# Patient Record
Sex: FEMALE | Employment: OTHER | URBAN - METROPOLITAN AREA
[De-identification: names, ages, dates, MRNs, and addresses within clinical notes are randomized per-mention and may not be internally consistent; named-entity substitution may affect disease eponyms.]

---

## 2017-03-29 ENCOUNTER — TRANSFERRED RECORDS (OUTPATIENT)
Dept: MULTI SPECIALTY CLINIC | Facility: CLINIC | Age: 52
End: 2017-03-29

## 2017-03-29 LAB
HPV ABSTRACT: NORMAL
PAP-ABSTRACT: NORMAL

## 2017-06-07 ENCOUNTER — PRE VISIT (OUTPATIENT)
Dept: NEUROLOGY | Facility: CLINIC | Age: 52
End: 2017-06-07

## 2017-06-07 NOTE — TELEPHONE ENCOUNTER
1.  Date/reason for appt:6/12/17, Myontonia Congenita  2.  Referring provider: ANUPAMA KIM  3.  Call to patient (Yes / No - short description): Yes, pt state last time she was seen was in 2010.   4.  Previous care at / records requested from:  5.  Other:

## 2017-06-12 ENCOUNTER — OFFICE VISIT (OUTPATIENT)
Dept: NEUROLOGY | Facility: CLINIC | Age: 52
End: 2017-06-12

## 2017-06-12 VITALS
HEIGHT: 67 IN | WEIGHT: 148 LBS | HEART RATE: 85 BPM | SYSTOLIC BLOOD PRESSURE: 134 MMHG | DIASTOLIC BLOOD PRESSURE: 71 MMHG | BODY MASS INDEX: 23.23 KG/M2

## 2017-06-12 DIAGNOSIS — G89.4 CHRONIC PAIN SYNDROME: ICD-10-CM

## 2017-06-12 DIAGNOSIS — G71.12 MYOTONIA CONGENITA: Primary | ICD-10-CM

## 2017-06-12 RX ORDER — GABAPENTIN 300 MG/1
CAPSULE ORAL
Qty: 90 CAPSULE | Refills: 1 | Status: SHIPPED | OUTPATIENT
Start: 2017-06-12 | End: 2017-06-22

## 2017-06-12 RX ORDER — ESCITALOPRAM OXALATE 5 MG/5ML
10 SOLUTION ORAL DAILY
COMMUNITY
End: 2018-06-27

## 2017-06-12 ASSESSMENT — PAIN SCALES - GENERAL: PAINLEVEL: NO PAIN (0)

## 2017-06-12 NOTE — MR AVS SNAPSHOT
After Visit Summary   6/12/2017    Odalys Bah    MRN: [id number]           Patient Information     Date Of Birth          1965        Visit Information        Provider Department      6/12/2017 2:50 PM Valeriy Perez MD [facility] Neurology        Today's Diagnoses     Myotonia congenita    -  1    Chronic pain syndrome           Follow-ups after your visit        Additional Services     [facility]       Your provider has referred you to: [facility] for Comprehensive Pain Management. Please call [phone] to make an appointment.     Clinic is located: [facility] [address] [place] [address] [id number] 4th [place] [address]      Please complete the following questions:    Procedure/Referral: Referral Only -  Comprehensive Evaluation and Management    What is your diagnosis for the patient's pain? Myotonia congenita but also myofascial pain    What are your specific questions for the pain specialist? Multidisciplinary management and education on chronic pain.     Are there any red flags that may impact the assessment or management of the patient? None    REGARDING OPIOID MEDICATIONS:  We will always address appropriateness of opioid pain medications. We do not prescribe on the patient's first visit and generally will not take on a prescribing role for stable chronic pain. When we do take on prescribing of opioids for chronic pain, it is in collaboration with the referring physician for an determined period of time (usually weeks to months), with an expectation that the primary physician or provider will assume the prescribing role if medications are effective at stable doses with demonstrated compliance.  Therefore, please do not assume that your prescribing responsibilities end on the day of pain clinic consultation.  Is this agreeable to you? YES      Please be aware that coverage of these services is subject to  the terms and limitations of your health insurance plan.  Call member services at your health plan with any benefit or coverage questions.      Please bring the following with you to your appointment or have sent to the [facility]:    (1) Any X-Rays, CTs or MRIs which have been performed that are not in Epic.  Contact the facility where they were done to arrange for  prior to your scheduled appointment.  Any new CT, MRI or other procedures ordered by your specialist must be performed at a [facility] facility or coordinated by your clinic's referral office.    (2) List of current medications   (3) This referral request   (4) Any documents/labs given to you for this referral                  Your next 10 appointments already scheduled     Jun 11, 2018 10:50 AM CDT   (Arrive by 10:35 AM)   Return Muscular Dystrophy with Valeriy Perez MD   [facility] ([facility])    [address] [place] [id number] [phone]              Future tests that were ordered for you today     Open Future Orders        Priority Expected Expires Ordered    PFT General Lab Testing Routine 6/12/2017 6/7/2018 6/7/2017            Who to contact     Please call your clinic at [phone] to:    Ask questions about your health    Make or cancel appointments    Discuss your medicines    Learn about your test results    Speak to your doctor   If you have compliments or concerns about an experience at your clinic, or if you wish to file a complaint, please contact [facility] Patient Relations at [phone] or email us at [email or web address]         Additional Information About Your Visit        SourceryharStipple Information     Nerdies is an electronic gateway that provides easy, online access to your medical records. With Nerdies, you can request a clinic appointment, read your test results, renew a prescription or communicate with your  "care team.     To sign up for [employer] Trust[employer] visit the website at [email or web address]   You will be asked to enter the access code listed below, as well as some personal information. Please follow the directions to create your username and password.     Your access code is: [email or web address]  Expires: 2017  6:30 AM     Your access code will  in 90 days. If you need help or a new code, please contact your [facility] or call [phone] for assistance.        Care EveryWhere ID     This is your Care EveryWhere ID. This could be used by other organizations to access your [facility] medical records  [id number]        Your Vitals Were     Pulse Height BMI (Body Mass Index)             85 1.689 m (5' 6.5\") 23.53 kg/m2          Blood Pressure from Last 3 Encounters:   17 134/71    Weight from Last 3 Encounters:   17 67.1 kg (148 lb)              We Performed the Following     [facility]          Today's Medication Changes          These changes are accurate as of: 17  3:52 PM.  If you have any questions, ask your nurse or doctor.               Start taking these medicines.        Dose/Directions    gabapentin 300 MG capsule   Commonly known as:  NEURONTIN   Used for:  Myotonia congenita   Started by:  Valeriy Perez MD        Take 1 capsule   Quantity:  90 capsule   Refills:  1            Where to get your medicines      Some of these will need a paper prescription and others can be bought over the counter.  Ask your nurse if you have questions.     Bring a paper prescription for each of these medications     gabapentin 300 MG capsule                Primary Care Provider    None Specified       No primary provider on file.        Thank you!     Thank you for choosing [facility]  for your care. Our goal is always to provide you with excellent care. Hearing back from our patients is one way we can continue to improve " our services. Please take a few minutes to complete the written survey that you may receive in the mail after your visit with us. Thank you!             Your Updated Medication List - Protect others around you: Learn how to safely use, store and throw away your medicines at www.disposemymeds.org.          This list is accurate as of: 6/12/17  3:52 PM.  Always use your most recent med list.                   Brand Name Dispense Instructions for use    escitalopram 5 MG/5ML solution    LEXAPRO     Take 10 mg by mouth daily       gabapentin 300 MG capsule    NEURONTIN    90 capsule    Take 1 capsule       HYDROXYZINE PAMOATE PO          LEVOTHYROXINE SODIUM PO      Take 125 mcg by mouth daily       TRAMADOL HCL PO      Take 50 mg by mouth

## 2017-06-12 NOTE — PROGRESS NOTES
"NEUROMUSCULAR CLINIC H&P  Odalys Bah  : 1965 AGE: 51 year old  MRN: [id number]  DOS: 2017      REASON FOR CONSULTATION: myotonia congenita    HPI / COURSE / ROS:  Pt is a 51F PMHx myotonia congenita, who presents to re-establish care for myotonia congenita.    When pt was in the sixth grade, pt first started noticing problems with sprinting, such that she could do some exercises, but sprinting would \"cause me to fall on my face.\" She developed increasing problems with muscle pain and stiffness, and she was diagnosed with myotonia congenita in the early  (Dr. Luan Marte) after EMG demonstrated myotonia in the absence of myopathic motor units. Genetic testing much later revealed a heterozygous mutation of CLCN1. She was treated with mexiletine for about ten years, but she developed racing heart rate symptoms. She underwent an EKG at all her Neurology visits, but these records are not all available. At any rate, she later developed GI symptoms, so she stopped taking this medication around . She then tried medical marijuana through a provider in [place], and indeed, this helped her pain quite a bit. However, this provider passed away, so she lost her source of medical marijuana. In the last three years, her , mother, and stepson all ; her muscle pain worsened after this. Over the last few years, she was followed by her Family Medicine PCP in [facility], where she described muscle pain with overuse, especially in the right periscapular area. PCP performed extensive workup to look for underlying cause, culminating in lung biopsy of an incidental nodule in the right upper lobe (she does have a significant smoking history). Lung biopsy revealed hamartoma only. She was recently contacted by our genetic counselor (Coni Iniguez GC) to re-evaluate need for additional genetic testing (see Results below), hence today's appointment.    Today in clinic, pt says that she gets the most muscle stiffness " "pain near the right shoulder blade and down the left arm and leg. She initially uses the word \"numbness,\" but she means pain. She occasionally gets this muscle pain at rest, but primarily it's when she overuses the muscle. She reports difficulty opening jars as a result. She denies weakness in her lower extremities, including no problems rising from a chair or with walking. She says that two of her adult children might have myotonia congenita, as well. She has not tried any other medications for myotonia or pain besides mexiletine or medical marijuana; she does not recall ever taking topiramate (which was prescribed in ).    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  Otherwise no known family history of neuromuscular conditions.    SOCIAL HISTORY  Pt lives in [place], [place]. Her  passed away three years ago. Mother and stepson also recently passed away. Smokinppd.    MEDICATIONS  Escitalopram 10mg QD  Hydroxyzine  Levothyroxine  Tramadol 50mg QD  Vitamin B12  History of mexiletine for about ten years (didn't tolerate due to racing heart rate and GI upset)    ALLERGIES  Allergies not on file    PHYSICAL EXAMINATION:  /71  Pulse 85  Ht 1.689 m (5' 6.5\")  Wt 67.1 kg (148 lb)  BMI 23.53 kg/m2  GEN: pleasant, NAD, muscular build  HEENT: atraumatic, no thinning of facial muscles  NECK: Supple  CV/CHEST: RRR  RESP: breathing room air comfortably  GI: soft, NT/ND  NEURO:   Mental status:  Awake, alert, converses fluently  Cranial nerves:  Subtle eyelid myotonia and lid lag present. No percussive myotonia of the tongue. PERRL, EOMI, facial sensation intact b/l, facial strength normal b/l with no dysarthria, hearing " "functional, uvula midline, shoulder shrug 5/5, tongue strength normal b/l with no fasciculations  Motor:  Action myotonia present with . Percussive myotonia present in b/l EDC muscles. Frequent spasms throughout strength testing. Manual motor testing reveals (L/R):  Neck Ext: 5  Neck Flex: 5  Shoulder abd: 5/5  Elbow flex: 5/5  Elbow ext: 5/5  Wrist ext: 5/5  Wrist flex: 5/5  Finger flex: 5/5  FDI: 5/5  Thumb abd: 5/5    Hip flex: 5/5  Knee ext: 5/5  Knee flex: 5/5  Ankle dorsiflex: 5/5  Ankle plantar flex: 5/5    Reflexes: 2+ and symmetric in upper and lower extremities.  Toes down going b/l.  Sensory:  Intact to vibration at the bilateral lower extremities (Rydel scores: 4s at the toes, 6s at the medial malleoli)  Coordination:  Intact FNF b/l  Gait:  Normal but limited by fast movements.      PERTINENT LABS  Genetic testing for DM1: normal (5 and 14 CTG repeats)  Genetic testing for SCN4A ([id number] and [id number]) and CLCN1 ([id number]): normal  Genetic testing for full CLCN1 gene: pathologic mutation detected (14bp deletion in exon 13)    CK 65    PERTINENT IMAGING & INVESTIGATIONS  EMG (1992, Dr. Luan Marte):  \"These results are abnormal and indicative of a myotonic disorder. 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 Luan Marte with abnormal EMG then further corroborated with genetic testing (see Results above). For her myotonia pain, since she does not tolerate mexiletine (classic adverse side effects of palpitations and GI upset), we will prescribe gabapentin, from which we have seen success on reducing myotonia symptoms. She was educated about the medical marijuana program in the state [facility]. Finally, she requests " referral to Pain Clinic, which we will oblige, but we will also slowly up-titrate gabapentin in the meantime.    RECS:  --Start gabapentin 300mg QD, then up-titrate by 300mg every week to 300mg TID; pt will contact our clinic in the near future to discuss results (will up-titrate further as needed and able)  --Referral to Pain Clinic  --Return to clinic in one year, phone call in 1 month to assess efficacy of gabapentin    Pt was discussed and evaluated with Dr. Valeriy Perez.    Jonathan Mirza MD  Neuromuscular Fellow  [phone]    ATTENDING ADDENDUM: Patient seen and examined with Fellow Dr Mirza in the [facility] today. Agree with above assessment and recs. TT spent for patient care 25 minutes ;more than half was counseling. Valeriy Perez MD

## 2017-06-12 NOTE — LETTER
"2017       RE: Odalys Bah  [address] [address] [place] [id number]     Dear Colleague,    Thank you for referring your patient, Odalys Bah, to the [facility] at [facility]. Please see a copy of my visit note below.    NEUROMUSCULAR CLINIC H&P  Odalys Bah  : 1965 AGE: 51 year old  MRN: [id number]  DOS: 2017    REASON FOR CONSULTATION: myotonia congenita    HPI / COURSE / ROS:  Pt is a 51F PMHx myotonia congenita, who presents to re-establish care for myotonia congenita.    When pt was in the sixth grade, pt first started noticing problems with sprinting, such that she could do some exercises, but sprinting would \"cause me to fall on my face.\" She developed increasing problems with muscle pain and stiffness, and she was diagnosed with myotonia congenita in the early  (Dr. Luan Marte) after EMG demonstrated myotonia in the absence of myopathic motor units. Genetic testing much later revealed a heterozygous mutation of CLCN1. She was treated with mexiletine for about ten years, but she developed racing heart rate symptoms. She underwent an EKG at all her Neurology visits, but these records are not all available. At any rate, she later developed GI symptoms, so she stopped taking this medication around . She then tried medical marijuana through a provider in [place], and indeed, this helped her pain quite a bit. However, this provider passed away, so she lost her source of medical marijuana. In the last three years, her , mother, and stepson all ; her muscle pain worsened after this. Over the last few years, she was followed by her Family Medicine PCP in [facility], where she described muscle pain with overuse, especially in the right periscapular area. PCP performed extensive workup to look for underlying cause, culminating in lung biopsy of an incidental nodule in the right upper lobe (she does have a significant smoking " "history). Lung biopsy revealed hamartoma only. She was recently contacted by our genetic counselor (Coni Iniguez GC) to re-evaluate need for additional genetic testing (see Results below), hence today's appointment.    Today in clinic, pt says that she gets the most muscle stiffness pain near the right shoulder blade and down the left arm and leg. She initially uses the word \"numbness,\" but she means pain. She occasionally gets this muscle pain at rest, but primarily it's when she overuses the muscle. She reports difficulty opening jars as a result. She denies weakness in her lower extremities, including no problems rising from a chair or with walking. She says that two of her adult children might have myotonia congenita, as well. She has not tried any other medications for myotonia or pain besides mexiletine or medical marijuana; she does not recall ever taking topiramate (which was prescribed in ).    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  Otherwise no known family history of neuromuscular conditions.    SOCIAL HISTORY  Pt lives in [place], [place]. Her  passed away three years ago. Mother and stepson also recently passed away. Smokinppd.    MEDICATIONS  Escitalopram 10mg QD  Hydroxyzine  Levothyroxine  Tramadol 50mg QD  Vitamin B12  History of mexiletine for about ten years (didn't tolerate due to racing heart rate and GI upset)    ALLERGIES  Allergies not on file    PHYSICAL EXAMINATION:  /71  Pulse 85  Ht 1.689 m (5' 6.5\")  Wt 67.1 kg (148 lb)  BMI 23.53 kg/m2  GEN: pleasant, NAD, muscular build  HEENT: atraumatic, no thinning of facial muscles  NECK: Supple  CV/CHEST: RRR  RESP: breathing room air " "comfortably  GI: soft, NT/ND  NEURO:   Mental status:  Awake, alert, converses fluently  Cranial nerves:  Subtle eyelid myotonia and lid lag present. No percussive myotonia of the tongue. PERRL, EOMI, facial sensation intact b/l, facial strength normal b/l with no dysarthria, hearing functional, uvula midline, shoulder shrug 5/5, tongue strength normal b/l with no fasciculations  Motor:  Action myotonia present with . Percussive myotonia present in b/l EDC muscles. Frequent spasms throughout strength testing. Manual motor testing reveals (L/R):  Neck Ext: 5  Neck Flex: 5  Shoulder abd: 5/5  Elbow flex: 5/5  Elbow ext: 5/5  Wrist ext: 5/5  Wrist flex: 5/5  Finger flex: 5/5  FDI: 5/5  Thumb abd: 5/5    Hip flex: 5/5  Knee ext: 5/5  Knee flex: 5/5  Ankle dorsiflex: 5/5  Ankle plantar flex: 5/5    Reflexes: 2+ and symmetric in upper and lower extremities.  Toes down going b/l.  Sensory:  Intact to vibration at the bilateral lower extremities (Rydel scores: 4s at the toes, 6s at the medial malleoli)  Coordination:  Intact FNF b/l  Gait:  Normal but limited by fast movements.      PERTINENT LABS  Genetic testing for DM1: normal (5 and 14 CTG repeats)  Genetic testing for SCN4A ([id number] and [id number]) and CLCN1 ([id number]): normal  Genetic testing for full CLCN1 gene: pathologic mutation detected (14bp deletion in exon 13)    CK 65    PERTINENT IMAGING & INVESTIGATIONS  EMG (1992, Dr. Luan Marte):  \"These results are abnormal and indicative of a myotonic disorder. 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 Luan Day with abnormal EMG then further corroborated with genetic testing (see Results above). For her myotonia pain, since she does " not tolerate mexiletine (classic adverse side effects of palpitations and GI upset), we will prescribe gabapentin, from which we have seen success on reducing myotonia symptoms. She was educated about the medical marijuana program in the [facility]. Finally, she requests referral to Pain Clinic, which we will oblige, but we will also slowly up-titrate gabapentin in the meantime.    RECS:  --Start gabapentin 300mg QD, then up-titrate by 300mg every week to 300mg TID; pt will contact our clinic in the near future to discuss results (will up-titrate further as needed and able)  --Referral to Pain Clinic  --Return to clinic in one year, phone call in 1 month to assess efficacy of gabapentin    Pt was discussed and evaluated with Dr. Valeriy Perez.    Jonathan Mirza MD  Neuromuscular Fellow  [phone]    ATTENDING ADDENDUM: Patient seen and examined with Fellow Dr Mirza in the [facility] today. Agree with above assessment and recs. TT spent for patient care 25 minutes ;more than half was counseling. Valeriy Perez MD    Again, thank you for allowing me to participate in the care of your patient.      Sincerely,    Valeriy Perez MD

## 2017-06-20 ENCOUNTER — TELEPHONE (OUTPATIENT)
Dept: NEUROLOGY | Facility: CLINIC | Age: 52
End: 2017-06-20

## 2017-06-20 NOTE — TELEPHONE ENCOUNTER
Per Dr. Perez, call out to double check with the patient to make sure she truly had hives on 6/18. If that is the case, Dr. Perez will not re-challenge her with gabapentin because this sounds like a true allergic reaction. In that case, he would prescribe her Lyrica.     Reviewed the above with patient who agrees to this plan. Rx for Lyrica faxed to [facility] in [place]. Patient is advised to contact clinic immediately and stop medication until she received advice from Dr. Perez should similar side effects occur. She is advised to update us in 1 month regarding effectiveness of this medication.

## 2017-06-20 NOTE — TELEPHONE ENCOUNTER
Contact: [phone]    Patient is experiencing possible side effects from gabapentin. She started gabapentin 300mg PO daily on 6/13/17. Patient developed a sore throat on 6/15/17, and hives on face and neck on 6/18/17. Patient stopped gabapentin immediately upon having hives. After stopping gabapentin, these issues resolved. During sore throat, patient used salt water gargling and ibuprofen for the pain. She did not use any topicals or medications for the hives. No other new medications. Patient does not have existing allergies other than to Nickel, which wasn't a factor.     Patient's plan is to restart gabapentin once symptoms resolve in the event that the symptoms were related to acute illness rather than medication side effects, but she is advised not to restart medication until she receives further advice from our clinic.     Will consult Dr. Perez and return call to patient.

## 2017-06-22 RX ORDER — PREGABALIN 50 MG/1
CAPSULE ORAL
Qty: 90 CAPSULE | Refills: 1 | Status: SHIPPED | OUTPATIENT
Start: 2017-06-22 | End: 2018-06-27

## 2018-06-27 ENCOUNTER — OFFICE VISIT (OUTPATIENT)
Dept: NEUROLOGY | Facility: CLINIC | Age: 53
End: 2018-06-27
Payer: OTHER GOVERNMENT

## 2018-06-27 VITALS
WEIGHT: 137.5 LBS | DIASTOLIC BLOOD PRESSURE: 62 MMHG | HEIGHT: 67 IN | BODY MASS INDEX: 21.58 KG/M2 | HEART RATE: 70 BPM | SYSTOLIC BLOOD PRESSURE: 126 MMHG

## 2018-06-27 DIAGNOSIS — G71.12 MYOTONIA CONGENITA: Primary | ICD-10-CM

## 2018-06-27 RX ORDER — LAMOTRIGINE 25 MG/1
1 TABLET ORAL SEE ADMIN INSTRUCTIONS
Qty: 1 KIT | Refills: 3 | Status: SHIPPED | OUTPATIENT
Start: 2018-06-27 | End: 2019-11-18

## 2018-06-27 ASSESSMENT — PAIN SCALES - GENERAL: PAINLEVEL: NO PAIN (0)

## 2018-06-27 NOTE — PROGRESS NOTES
Service Date: 2018       Chidi Dupree DO   [facility]    [address] [place] [address]      RE: Odalys Bah   MRN: [id number]    : 1965           Dear Dr. Dupree:        I had the pleasure to see Ms. Bah in followup at the [facility] today.  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  Her chief complaint is muscle pain throughout which could be related to the disease.  Unfortunately, she has had very poor tolerance to medications used to treat myotonia related pain, and this has precluded us from working with her efficiently.  She previously could not tolerate mexiletine because of palpitations and GI upset, so she stopped taking it in .  She tried medical marijuana from [place] and now is smoking pot in [place] to treat her pain.  I tried her on gabapentin and she broke out in hives about 2 weeks after starting the drug, so we switched this to Lyrica.  Unfortunately, she could not tolerate Lyrica because even low doses were causing her headache, dizziness and confusion, so she dropped it last fall.  She has not tried lamotrigine, phenytoin or carbamazepine.  I referred her to a pain clinic; she went to [facility] and they proposed her to start tramadol or a number of other pain drugs, She declined all the options offered and relied on smoking marijuana.      Medications were reviewed and are as per Epic record.        I did not repeat her physical exam.  Her blood pressure was 126/62.  Pulse is 70 and regular.  She weighs 62.3 kilos.  Height is 168.  She endorses no pain today, but she does look uncomfortable and grimacing during the exam.      In summary, Ms. Bah has myotonia congenita and diffuse body pain.  Treatment is difficult because of intolerance to multiple medications.  I proposed her to try lamotrigine, which worked in a recent  randomized placebo controlled trial for myotonia congenita done in [place].  I will prescribe the same starter kit that is used for seizures beginning from 25 mg daily and increasing the dose by 25 every 2 weeks.  The goal dose should be at least in the range of 150-200 mg daily.  Side effects including the risk of rash were explained.  I proposed her this drug because this is the best tolerated among anticonvulsants and with the least chance of creating confusion, dizziness, headaches or other side effects besides the rash.  I asked her to call me back in about 3 months with an update.  If this has not worked I am afraid we do not many other options available.  We could try phenytoin or carbamazepine, but I highly doubt that she will tolerate those medications given her poor tolerance to gabapentin and Lyrica.  I told her that we could consider the option of medical cannabis, which is regulated through the [facility], but the patient is reluctant to do it largely because of the cost of and the fact it is not covered by insurance.  I explained to her that of course I cannot, as a physician, recommend smoking marijuana to treat myotonia pain, and she understands why. I will see her in followup in our clinic in 1 year or earlier if necessary. TT spent for patient care 15 minutes; more than half was counseling.      Sincerely,        MD RADHA Smith MD             D: 2018   T: 2018   MT: jer      Name:     MARCO ANTONIO URIARTE   MRN:      [id number]        Account:      [id number]   :      1965           Service Date: 2018      Document: [id number]

## 2018-06-27 NOTE — MR AVS SNAPSHOT
"              After Visit Summary   6/27/2018    Odalys Bah    MRN: [id number]           Patient Information     Date Of Birth          1965        Visit Information        Provider Department      6/27/2018 11:40 AM Valeriy Perez MD [facility] Neurology        Today's Diagnoses     Myotonia congenita    -  1      Care Instructions    We will prescribe a drug called lamotrigine for you. Please call us if you develop any rash on the drug (5-7% chance).  Otherwise call in 3 months- this drug is increased slowly, and is slow to work.    Follow up in Clinic in 1 year.           Follow-ups after your visit        Follow-up notes from your care team     Return in about 1 year (around 6/27/2019).      Your next 10 appointments already scheduled     Jun 27, 2018 11:40 AM CDT   (Arrive by 11:25 AM)   Return Muscular Dystrophy with Valeriy Perez MD   [facility] Neurology ([facility])    [address] [place] [id number] [phone]              Who to contact     Please call your clinic at [phone] to:    Ask questions about your health    Make or cancel appointments    Discuss your medicines    Learn about your test results    Speak to your doctor            Additional Information About Your Visit        Care EveryWhere ID     This is your Care EveryWhere ID. This could be used by other organizations to access your [facility] medical records  [id number]        Your Vitals Were     Pulse Height BMI (Body Mass Index)             70 1.689 m (5' 6.5\") 21.86 kg/m2          Blood Pressure from Last 3 Encounters:   06/27/18 126/62   06/12/17 134/71    Weight from Last 3 Encounters:   06/27/18 62.4 kg (137 lb 8 oz)   06/12/17 67.1 kg (148 lb)              Today, you had the following     No orders found for display         Today's Medication Changes          These changes are accurate as of 6/27/18 11:07 AM.  If you have any questions, ask " your nurse or doctor.               Start taking these medicines.        Dose/Directions    LamoTRIgine Starter Kit-Blue 25 (35) MG Kit   Used for:  Myotonia congenita   Started by:  Valeriy Perez MD        Dose:  1 kit   Take 1 kit by mouth See Admin Instructions   Quantity:  1 kit   Refills:  3            Where to get your medicines      These medications were sent to [facility] [address] [place] [address] [facility]  [address] [facility] [address]     Phone:  [phone]     LamoTRIgine Starter Kit-Blue 25 (35) MG Kit                Primary Care Provider    None Specified       No primary provider on file.        Equal Access to Services     [facility]: Hadii dylon roberts hadviviana Figueroa, waaxda luquincyadaha, qaybta kaalmada mary, nikole turner . So [facility] [place] [phone].    ATENCIÓN: Si habla español, tiene a watson disposición servicios gratuitos de asistencia lingüística. [facility] [phone].    We comply with applicable federal civil rights laws and [place] laws. We do not discriminate on the basis of race, color, national origin, age, disability, sex, sexual orientation, or gender identity.            Thank you!     Thank you for choosing [facility] NEUROLOGY  for your care. Our goal is always to provide you with excellent care. Hearing back from our patients is one way we can continue to improve our services. Please take a few minutes to complete the written survey that you may receive in the mail after your visit with us. Thank you!             Your Updated Medication List - Protect others around you: Learn how to safely use, store and throw away your medicines at www.disposemymeds.org.          This list is accurate as of 6/27/18 11:07 AM.  Always use your most recent med list.                   Brand Name Dispense Instructions for use Diagnosis    HYDROXYZINE PAMOATE PO           LamoTRIgine Starter Kit-Blue 25 (35) MG Kit     1 kit    Take 1 kit by  mouth See Admin Instructions    Myotonia congenita       LEVOTHYROXINE SODIUM PO      Take 125 mcg by mouth daily

## 2018-06-27 NOTE — LETTER
2018       RE: Odalys Bah  [address] [facility] [address]     Dear Colleague,    Thank you for referring your patient, Odalys Bah, to the [facility] NEUROLOGY at [facility]. Please see a copy of my visit note below.    Service Date: 2018       Chidi Dupree DO   [facility]    [address] [place] [address]      RE: Odalys Bah   MRN: [id number]    : 1965           Dear Dr. Dupree:        I had the pleasure to see Ms. Bah in followup at the [facility] today.  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  Her chief complaint is muscle pain throughout which could be related to the disease.  Unfortunately, she has had very poor tolerance to medications used to treat myotonia related pain, and this has precluded us from working with her efficiently.  She previously could not tolerate mexiletine because of palpitations and GI upset, so she stopped taking it in .  She tried medical marijuana from [place] and now is smoking pot in [place] to treat her pain.  I tried her on gabapentin and she broke out in hives about 2 weeks after starting the drug, so we switched this to Lyrica.  Unfortunately, she could not tolerate Lyrica because even low doses were causing her headache, dizziness and confusion, so she dropped it last fall.  She has not tried lamotrigine, phenytoin or carbamazepine.  I referred her to a pain clinic; she went to [facility] and they proposed her to start tramadol or a number of other pain drugs, She declined all the options offered and relied on smoking marijuana.      Medications were reviewed and are as per Epic record.        I did not repeat her physical exam.  Her blood pressure was 126/62.  Pulse is 70 and regular.  She weighs 62.3 kilos.  Height is 168.  She endorses no  pain today, but she does look uncomfortable and grimacing during the exam.      In summary, Ms. Uriarte has myotonia congenita and diffuse body pain.  Treatment is difficult because of intolerance to multiple medications.  I proposed her to try lamotrigine, which worked in a recent randomized placebo controlled trial for myotonia congenita done in [place].  I will prescribe the same starter kit that is used for seizures beginning from 25 mg daily and increasing the dose by 25 every 2 weeks.  The goal dose should be at least in the range of 150-200 mg daily.  Side effects including the risk of rash were explained.  I proposed her this drug because this is the best tolerated among anticonvulsants and with the least chance of creating confusion, dizziness, headaches or other side effects besides the rash.  I asked her to call me back in about 3 months with an update.  If this has not worked I am afraid we do not many other options available.  We could try phenytoin or carbamazepine, but I highly doubt that she will tolerate those medications given her poor tolerance to gabapentin and Lyrica.  I told her that we could consider the option of medical cannabis, which is regulated through the [facility], but the patient is reluctant to do it largely because of the cost of and the fact it is not covered by insurance.  I explained to her that of course I cannot, as a physician, recommend smoking marijuana to treat myotonia pain, and she understands why. I will see her in followup in our clinic in 1 year or earlier if necessary. TT spent for patient care 15 minutes; more than half was counseling.      D: 2018   T: 2018   MT: tb      Name:     MARCO ANTONIO URIARTE   MRN:      [id number]        Account:      [id number]   :      1965           Service Date: 2018      Document: [id number]        Again, thank you for allowing me to participate in the care of your patient.      Sincerely,    Valeriy Perez,  MD

## 2018-06-27 NOTE — PATIENT INSTRUCTIONS
We will prescribe a drug called lamotrigine for you. Please call us if you develop any rash on the drug (5-7% chance).  Otherwise call in 3 months- this drug is increased slowly, and is slow to work.    Follow up in Clinic in 1 year.

## 2018-06-28 ASSESSMENT — PATIENT HEALTH QUESTIONNAIRE - PHQ9: SUM OF ALL RESPONSES TO PHQ QUESTIONS 1-9: 10

## 2019-05-20 ENCOUNTER — TRANSFERRED RECORDS (OUTPATIENT)
Dept: MULTI SPECIALTY CLINIC | Facility: CLINIC | Age: 54
End: 2019-05-20

## 2019-05-20 LAB — EJECTION FRACTION: NORMAL %

## 2019-11-18 ENCOUNTER — OFFICE VISIT (OUTPATIENT)
Dept: FAMILY MEDICINE | Facility: CLINIC | Age: 54
End: 2019-11-18
Payer: MEDICARE

## 2019-11-18 VITALS
WEIGHT: 140.2 LBS | HEIGHT: 67 IN | RESPIRATION RATE: 18 BRPM | BODY MASS INDEX: 22 KG/M2 | HEART RATE: 96 BPM | OXYGEN SATURATION: 97 % | SYSTOLIC BLOOD PRESSURE: 135 MMHG | DIASTOLIC BLOOD PRESSURE: 84 MMHG | TEMPERATURE: 98.4 F

## 2019-11-18 DIAGNOSIS — J44.9 CHRONIC OBSTRUCTIVE PULMONARY DISEASE, UNSPECIFIED COPD TYPE (H): ICD-10-CM

## 2019-11-18 DIAGNOSIS — G71.12 CONGENITAL MYOTONIA, AUTOSOMAL DOMINANT FORM: ICD-10-CM

## 2019-11-18 DIAGNOSIS — F17.210 CIGARETTE NICOTINE DEPENDENCE WITHOUT COMPLICATION: ICD-10-CM

## 2019-11-18 DIAGNOSIS — Z76.89 ENCOUNTER TO ESTABLISH CARE: ICD-10-CM

## 2019-11-18 DIAGNOSIS — N95.1 VASOMOTOR SYMPTOMS DUE TO MENOPAUSE: ICD-10-CM

## 2019-11-18 DIAGNOSIS — F33.1 MODERATE EPISODE OF RECURRENT MAJOR DEPRESSIVE DISORDER (H): Primary | ICD-10-CM

## 2019-11-18 DIAGNOSIS — F41.0 PANIC ATTACK: ICD-10-CM

## 2019-11-18 PROBLEM — F33.9 RECURRENT MAJOR DEPRESSIVE DISORDER (H): Status: ACTIVE | Noted: 2017-03-03

## 2019-11-18 PROCEDURE — 99204 OFFICE O/P NEW MOD 45 MIN: CPT | Performed by: FAMILY MEDICINE

## 2019-11-18 RX ORDER — BUDESONIDE AND FORMOTEROL FUMARATE DIHYDRATE 160; 4.5 UG/1; UG/1
2 AEROSOL RESPIRATORY (INHALATION)
COMMUNITY
Start: 2019-04-30 | End: 2019-11-18

## 2019-11-18 RX ORDER — BUDESONIDE AND FORMOTEROL FUMARATE DIHYDRATE 160; 4.5 UG/1; UG/1
2 AEROSOL RESPIRATORY (INHALATION) 2 TIMES DAILY
Qty: 2 INHALER | Refills: 3 | Status: SHIPPED | OUTPATIENT
Start: 2019-11-18

## 2019-11-18 RX ORDER — LEVOTHYROXINE SODIUM 125 UG/1
125 TABLET ORAL DAILY
COMMUNITY
Start: 2019-10-18 | End: 2020-05-28

## 2019-11-18 RX ORDER — DULOXETIN HYDROCHLORIDE 30 MG/1
30 CAPSULE, DELAYED RELEASE ORAL DAILY
Qty: 30 CAPSULE | Refills: 0 | Status: SHIPPED | OUTPATIENT
Start: 2019-11-18

## 2019-11-18 RX ORDER — BUDESONIDE AND FORMOTEROL FUMARATE DIHYDRATE 160; 4.5 UG/1; UG/1
2 AEROSOL RESPIRATORY (INHALATION) DAILY
Qty: 2 INHALER | Refills: 3 | Status: SHIPPED | OUTPATIENT
Start: 2019-11-18 | End: 2019-11-18

## 2019-11-18 RX ORDER — HYDROXYZINE PAMOATE 25 MG/1
25 CAPSULE ORAL 4 TIMES DAILY PRN
Qty: 90 CAPSULE | Refills: 1 | Status: SHIPPED | OUTPATIENT
Start: 2019-11-18

## 2019-11-18 ASSESSMENT — ANXIETY QUESTIONNAIRES
4. TROUBLE RELAXING: MORE THAN HALF THE DAYS
GAD7 TOTAL SCORE: 8
2. NOT BEING ABLE TO STOP OR CONTROL WORRYING: SEVERAL DAYS
7. FEELING AFRAID AS IF SOMETHING AWFUL MIGHT HAPPEN: NOT AT ALL
6. BECOMING EASILY ANNOYED OR IRRITABLE: SEVERAL DAYS
GAD7 TOTAL SCORE: 8
1. FEELING NERVOUS, ANXIOUS, OR ON EDGE: SEVERAL DAYS
7. FEELING AFRAID AS IF SOMETHING AWFUL MIGHT HAPPEN: NOT AT ALL
GAD7 TOTAL SCORE: 8
5. BEING SO RESTLESS THAT IT IS HARD TO SIT STILL: MORE THAN HALF THE DAYS
3. WORRYING TOO MUCH ABOUT DIFFERENT THINGS: SEVERAL DAYS

## 2019-11-18 ASSESSMENT — PATIENT HEALTH QUESTIONNAIRE - PHQ9: SUM OF ALL RESPONSES TO PHQ QUESTIONS 1-9: 13

## 2019-11-18 ASSESSMENT — MIFFLIN-ST. JEOR: SCORE: 1250.63

## 2019-11-18 NOTE — PROGRESS NOTES
Subjective     Odalys Bah is a 54 year old female who presents to clinic today for the following health issues:    History of Present Illness        COPD:  She presents for follow up of COPD.  Overall, COPD symptoms are slightly worse since last visit. She has more than usual fatigue or shortness of breath with exertion and same as usual shortness of breath at rest.  She often coughs and does have change in sputum. No recent fever. She can walk less than 1 block without stopping to rest. She can walk 1 flights of stairs without resting.The patient has had no ED, urgent care, or hospital admissions because of COPD since the last visit.     Mental Health Follow-up:  Patient presents to follow-up on Anxiety.    Patient's anxiety since last visit has been:  Better  The patient is not having other symptoms associated with anxiety.  Any significant life events: No  Patient is not feeling anxious or having panic attacks.  Patient has no concerns about alcohol or drug use.     Social History  Tobacco Use    Smoking status: Current Every Day Smoker    Smokeless tobacco: Never Used  Alcohol use: Yes  Drug use: Yes    Types: Marijuana      Today's PHQ-9         PHQ-9 Total Score:         PHQ-9 Q9 Thoughts of better off dead/self-harm past 2 weeks :       Thoughts of suicide or self harm:      Self-harm Plan:        Self-harm Action:          Safety concerns for self or others:           Hyperlipidemia:  She presents for follow up of hyperlipidemia.  She is not taking medication to lower cholesterol. She is not having myalgia or other side effects to statin medications.    Hypothyroidism:     Since last visit, patient describes the following symptoms::  Anxiety, Constipation and Tremors    She eats 0-1 servings of fruits and vegetables daily.She consumes 1 sweetened beverage(s) daily.She is missing 1 dose(s) of medications per week.  She is not taking prescribed medications regularly due to remembering to take.     Establish  "care . Want to talk about hot flashes.pt has been experiencing hot flashes since 4 yrs.  Takes \"menopause one\"  to help with her hot flashes but that hasn't worked very well.     Patient also has a history of chronic pain related to congenital myotonia. Up until a few months ago her PCP would prescribe her tramadol 50 to use up to 4 times a day as needed. She has not had any for a few months because she moved to the area and was unable to make her scheduled appointments and this resulted in contract violation. Currently smokes marijuana to help with her pain.     For her COPD she reports using Symbicort just once a day and she has no rescue inhaler. Notes shortness of breath even with just sitting.     Pap- 3/29/2017- supracervical Hysterectomy       Patient Active Problem List   Diagnosis     Chronic pain disorder     Congenital myotonia, autosomal dominant form     COPD (chronic obstructive pulmonary disease) (H)     Cigarette nicotine dependence without complication     Hypothyroidism     Recurrent major depressive disorder (H)     Vasomotor symptoms due to menopause     Past Surgical History:   Procedure Laterality Date     HYSTERECTOMY SUPRACERVICAL  2008       Social History     Tobacco Use     Smoking status: Current Every Day Smoker     Smokeless tobacco: Never Used   Substance Use Topics     Alcohol use: Yes     History reviewed. No pertinent family history.        Reviewed and updated as needed this visit by Provider         Review of Systems   ROS COMP: Constitutional, HEENT, cardiovascular, pulmonary, GI, , musculoskeletal, neuro, skin, endocrine and psych systems are negative, except as otherwise noted.      Objective    /84 (BP Location: Right arm, Patient Position: Chair, Cuff Size: Adult Regular)   Pulse 96   Temp 98.4  F (36.9  C) (Oral)   Resp 18   Ht 1.689 m (5' 6.5\")   Wt 63.6 kg (140 lb 3.2 oz)   SpO2 97%   BMI 22.29 kg/m    Body mass index is 22.29 kg/m .  Physical Exam "   GENERAL: healthy, alert and no distress  EYES: Eyes grossly normal to inspection, PERRL and conjunctivae and sclerae normal  NECK: no adenopathy  RESP: lungs clear to auscultation - no rales, rhonchi or wheezes  CV: regular rate and rhythm, normal S1 S2  ABDOMEN: soft, nontender,  and bowel sounds normal  MS: no edema, antalgic gait   SKIN: no suspicious lesions or rashes  NEURO: Normal strength and tone, mentation intact and speech normal  PSYCH: mentation appears normal, affect normal/bright    Diagnostic Test Results:  Labs reviewed in Epic  none         Assessment & Plan     1. Moderate episode of recurrent major depressive disorder (H)  PHQ-9- 13;   - hydrOXYzine (VISTARIL) 25 MG capsule; Take 1 capsule (25 mg) by mouth 4 times daily as needed for anxiety  Dispense: 90 capsule; Refill: 1  - DULoxetine (CYMBALTA) 30 MG capsule; Take 1 capsule (30 mg) by mouth daily  Dispense: 30 capsule; Refill: 0    2. Chronic obstructive pulmonary disease, unspecified COPD type (H)  - needs improvement. Patient not using Symbicort BID as prescribed. Advised she make the change today. Also advise establishing care with pulm- referral placed today.   - budesonide-formoterol (SYMBICORT) 160-4.5 MCG/ACT Inhaler; Inhale 2 puffs into the lungs 2 times daily  Dispense: 2 Inhaler; Refill: 3  - PULMONARY MEDICINE REFERRAL    3. Cigarette nicotine dependence without complication  - smoking cessation encouraged.     4. Congenital myotonia, autosomal dominant form  - since she has been without tramadol for a while I recommend trying Cymbalta to see if this helps with mood and chronic pain. Can always titrate dose to reach desired goal of being pain free.   - DULoxetine (CYMBALTA) 30 MG capsule; Take 1 capsule (30 mg) by mouth daily  Dispense: 30 capsule; Refill: 0    5. Vasomotor symptoms due to menopause  - supportive care also reviewed.   - DULoxetine (CYMBALTA) 30 MG capsule; Take 1 capsule (30 mg) by mouth daily  Dispense: 30  capsule; Refill: 0    6. Panic attack   - hydrOXYzine (VISTARIL) 25 MG capsule; Take 1 capsule (25 mg) by mouth 4 times daily as needed for anxiety  Dispense: 90 capsule; Refill: 1    7. Encounter to establish care         Tobacco Cessation:   reports that she has been smoking. She has never used smokeless tobacco.  Tobacco Cessation Action Plan: Information offered: Patient not interested at this time        See Patient Instructions    Return in about 1 month (around 12/18/2019) for medication recheck.    Haley Grace MD  [facility]

## 2019-11-19 ASSESSMENT — ANXIETY QUESTIONNAIRES: GAD7 TOTAL SCORE: 8

## 2019-11-25 ENCOUNTER — TELEPHONE (OUTPATIENT)
Dept: FAMILY MEDICINE | Facility: CLINIC | Age: 54
End: 2019-11-25

## 2019-11-25 NOTE — TELEPHONE ENCOUNTER
"Left message to call back.  Please call insurance for covered alternate. Or is this is a deductible issue?   [phone]   \"This is too expensive for patient. Please send an alternate.\"  Sanket Barton RN    "

## 2019-11-26 NOTE — TELEPHONE ENCOUNTER
No call back from patient. Left message for [facility] to send list of lower cost options and/or ask customer to call our office [phone].   Sanket Barton RN

## 2019-11-26 NOTE — TELEPHONE ENCOUNTER
No call back from patient. Left message for [facility] to send list of lower cost options and/or ask customer to call our office [phone].   Sanket Barton RN

## 2019-11-26 NOTE — TELEPHONE ENCOUNTER
We can wait on patient to return call. She just transferred care to our clinic and has been on symbicort for quite a while.   She could call her insurance for alternatives.

## 2019-11-26 NOTE — TELEPHONE ENCOUNTER
Dr. Cam choose alternate? Or would you prefer we wait for patient or pharmacy to reply?   Sanket Barton RN

## 2020-01-21 ENCOUNTER — TELEPHONE (OUTPATIENT)
Dept: FAMILY MEDICINE | Facility: CLINIC | Age: 55
End: 2020-01-21

## 2020-01-21 NOTE — TELEPHONE ENCOUNTER
Summary:    Patient is due/failing the following:   COLONOSCOPY and MAMMOGRAM    Reviewed:  [x] CARE EVERYWHERE  [x] LAST OV NOTE INCLUDING ENDO  [x] FYI TAB  [x] LAST PANEL ENCOUNTER  [x] FUTURE APTS  [x] MYCHART STATUS   [x] IMMUNIZATIONS  Action needed:   Patient needs referral/order: mammogram. Found pt colonoscopy in care everywhere done at [facility] 12/29/14 results were normal.    Type of outreach:    Phone, left message for patient to call back.                                                                                Marilee Tee MA

## 2020-05-28 DIAGNOSIS — E03.9 HYPOTHYROIDISM, UNSPECIFIED TYPE: Primary | ICD-10-CM

## 2020-05-28 NOTE — TELEPHONE ENCOUNTER
Routing refill request to provider for review/approval because:  Labs not current:  TSH    TSH   Date Value Ref Range Status   01/24/2006 6.77 (H) 0.4 - 5.0 mU/L Final     Yojana PITTS, RN, BSN

## 2020-05-28 NOTE — TELEPHONE ENCOUNTER
Medication Question or Refill  Who is calling: Eric Morrow  What medication are you calling about (include dose and sig)?:   levothyroxine (SYNTHROID/LEVOTHROID) 125 MCG tablet 125 mcg, DAILY       Controlled Substance Agreement on file: No  Who prescribed the medication?: Elina Abarca  Do you need a refill? Yes: pt has been out for one week  When did you use the medication last? One week ago  Patient offered an appointment? Yes: but pt is wondering if lab orders can be put in for her thyroid?  Do you have any questions or concerns?  Yes: pt is willing to have a visit with Jesus Manuel Abarca to get ordes if needed  Requested Pharmacy: [address]  Okay to leave a detailed message?: Yes at Cell number on file:    Telephone Information:   Mobile [phone]     Rocío Anderson Patient Representative

## 2020-05-29 ENCOUNTER — VIRTUAL VISIT (OUTPATIENT)
Dept: FAMILY MEDICINE | Facility: CLINIC | Age: 55
End: 2020-05-29
Payer: MEDICARE

## 2020-05-29 DIAGNOSIS — E03.9 HYPOTHYROIDISM, UNSPECIFIED TYPE: Primary | ICD-10-CM

## 2020-05-29 PROCEDURE — 99213 OFFICE O/P EST LOW 20 MIN: CPT | Mod: 95 | Performed by: FAMILY MEDICINE

## 2020-05-29 RX ORDER — LEVOTHYROXINE SODIUM 125 UG/1
125 TABLET ORAL DAILY
Qty: 30 TABLET | Refills: 0 | Status: SHIPPED | OUTPATIENT
Start: 2020-05-29 | End: 2020-06-26 | Stop reason: ALTCHOICE

## 2020-05-29 NOTE — TELEPHONE ENCOUNTER
30 day supply given. She is way past due on labs. She will need a visit and labs for further refills.     FLAKO

## 2020-05-29 NOTE — PROGRESS NOTES
"Odalys Bah is a 54 year old female who is being evaluated via a billable telephone visit.      The patient has been notified of following:     \"This telephone visit will be conducted via a call between you and your physician/provider. We have found that certain health care needs can be provided without the need for a physical exam.  This service lets us provide the care you need with a short phone conversation.  If a prescription is necessary we can send it directly to your pharmacy.  If lab work is needed we can place an order for that and you can then stop by our lab to have the test done at a later time.    Telephone visits are billed at different rates depending on your insurance coverage. During this emergency period, for some insurers they may be billed the same as an in-person visit.  Please reach out to your insurance provider with any questions.    If during the course of the call the physician/provider feels a telephone visit is not appropriate, you will not be charged for this service.\"    Patient has given verbal consent for Telephone visit?  Yes    What phone number would you like to be contacted at? [phone]    How would you like to obtain your AVS? Mail a copy    Subjective     Odalys Bah is a 54 year old female who presents via phone visit today for the following health issues:    HPI  Medication Followup of levothyroxine    Taking Medication as prescribed: yes, out of medication now    Side Effects:  Only from not taking them    Medication Helping Symptoms:  yes     Patient has been out of synthroid for about a week and half.           Patient Active Problem List   Diagnosis     Chronic pain disorder     Congenital myotonia, autosomal dominant form     COPD (chronic obstructive pulmonary disease) (H)     Cigarette nicotine dependence without complication     Hypothyroidism     Recurrent major depressive disorder (H)     Vasomotor symptoms due to menopause     Past Surgical History: "   Procedure Laterality Date     HYSTERECTOMY SUPRACERVICAL  2008       Social History     Tobacco Use     Smoking status: Current Every Day Smoker     Smokeless tobacco: Never Used   Substance Use Topics     Alcohol use: Yes     History reviewed. No pertinent family history.        Reviewed and updated as needed this visit by Provider         Review of Systems   Constitutional, HEENT, cardiovascular, pulmonary, GI, , musculoskeletal, neuro, skin, endocrine and psych systems are negative, except as otherwise noted.       Objective   Reported vitals:  There were no vitals taken for this visit.   healthy, alert and no distress  PSYCH: Alert and oriented times 3; coherent speech, normal   rate and volume, able to articulate logical thoughts, able   to abstract reason, no tangential thoughts, no hallucinations   or delusions  Her affect is normal  RESP: No cough, no audible wheezing, able to talk in full sentences  Remainder of exam unable to be completed due to telephone visits    Diagnostic Test Results:  Labs reviewed in Epic  none         Assessment/Plan:  1. Hypothyroidism, unspecified type  - will start on former dose of synthroid 125 mcg and recheck labs in a couple of weeks.   - **TSH with free T4 reflex FUTURE anytime; Future    No follow-ups on file.      Video-Visit Details    Type of service:  Video Visit  Video start time: 11:03 am  Video End Time:11:08am    Originating Location (pt. Location): Home    Distant Location (provider location):  [facility]     Platform used for Video Visit: MD Haley Jon MD

## 2020-05-29 NOTE — TELEPHONE ENCOUNTER
Left detailed message for patient to make appointment and lab for further refills of medication.     Aure Watts RN Flex

## 2020-06-24 DIAGNOSIS — E03.9 HYPOTHYROIDISM, UNSPECIFIED TYPE: ICD-10-CM

## 2020-06-24 NOTE — TELEPHONE ENCOUNTER
At last visit advised to get labs to know appropriate dose to place her on. Needs to do this    NWD

## 2020-06-24 NOTE — TELEPHONE ENCOUNTER
Routing refill request to provider for review/approval because:  Labs not current:  TSH    Harsh Shine RN

## 2020-06-26 DIAGNOSIS — E03.9 HYPOTHYROIDISM, UNSPECIFIED TYPE: Primary | ICD-10-CM

## 2020-06-26 DIAGNOSIS — E03.9 HYPOTHYROIDISM, UNSPECIFIED TYPE: ICD-10-CM

## 2020-06-26 LAB
T4 FREE SERPL-MCNC: 1.65 NG/DL (ref 0.76–1.46)
TSH SERPL DL<=0.005 MIU/L-ACNC: 0.12 MU/L (ref 0.4–4)

## 2020-06-26 PROCEDURE — 36415 COLL VENOUS BLD VENIPUNCTURE: CPT | Performed by: FAMILY MEDICINE

## 2020-06-26 PROCEDURE — 84439 ASSAY OF FREE THYROXINE: CPT | Performed by: FAMILY MEDICINE

## 2020-06-26 PROCEDURE — 84443 ASSAY THYROID STIM HORMONE: CPT | Performed by: FAMILY MEDICINE

## 2020-06-26 RX ORDER — LEVOTHYROXINE SODIUM 100 UG/1
TABLET ORAL
Qty: 90 TABLET | OUTPATIENT
Start: 2020-06-26

## 2020-06-26 RX ORDER — LEVOTHYROXINE SODIUM 100 UG/1
100 TABLET ORAL DAILY
Qty: 30 TABLET | Refills: 1 | Status: SHIPPED | OUTPATIENT
Start: 2020-06-26

## 2020-06-26 RX ORDER — LEVOTHYROXINE SODIUM 125 UG/1
TABLET ORAL
Qty: 90 TABLET | OUTPATIENT
Start: 2020-06-26

## 2020-06-26 NOTE — TELEPHONE ENCOUNTER
Late entry. Lab results reviewed with patient. She agrees to dose adjustment and will f/u lab only between Aug 7-21. FYI  We offered OV for care gaps. Declined because moving to [place] July 20 so will be transferring care closer to home. She will call back in 1-2 weeks to schedule lab only.   Sanket Barton RN

## 2021-03-31 ENCOUNTER — TELEPHONE (OUTPATIENT)
Dept: FAMILY MEDICINE | Facility: CLINIC | Age: 56
End: 2021-03-31

## 2021-03-31 NOTE — TELEPHONE ENCOUNTER
Patient Quality Outreach Summary      Summary:    Patient is due/failing the following:   Cervical Cancer Screening - PAP Needed    Type of outreach:    none found in care everywhere 3/29/17 [facility]     Questions for provider review:    None                                                                                                                    Marilee Tee MA       Chart routed to none.